# Patient Record
(demographics unavailable — no encounter records)

---

## 2024-12-07 NOTE — PLAN
[FreeTextEntry1] : 1.  Health maintenance Testicular Self-Exams recommended  Annual skin cancer screening is recommended Safe Sex Counseling given Exercise and Nutrition counseling given     2.  Prediabetes Exercise and nutrition counseling given, will check A1c, continue with metformin and Ozempic  3.  Obesity Patient is almost out of the obesity range, BMI is around 30 improving, psychosocial support provided, diet and exercise counseling given  4.  Preexposure prophylaxis use for HIV As patient has not been sexually active we will skip the 3 site GC chlamydia swabs, HIV and syphilis screening, safe sex counseling

## 2024-12-07 NOTE — HISTORY OF PRESENT ILLNESS
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

## 2024-12-07 NOTE — HISTORY OF PRESENT ILLNESS
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

## 2024-12-07 NOTE — PHYSICAL EXAM
[No Acute Distress] : no acute distress [Well Nourished] : well nourished [Well Developed] : well developed [Well-Appearing] : well-appearing [Normal Sclera/Conjunctiva] : normal sclera/conjunctiva [PERRL] : pupils equal round and reactive to light [EOMI] : extraocular movements intact [Normal Outer Ear/Nose] : the outer ears and nose were normal in appearance [Normal Oropharynx] : the oropharynx was normal [No JVD] : no jugular venous distention [No Lymphadenopathy] : no lymphadenopathy [Supple] : supple [Thyroid Normal, No Nodules] : the thyroid was normal and there were no nodules present [No Respiratory Distress] : no respiratory distress  [No Accessory Muscle Use] : no accessory muscle use [Clear to Auscultation] : lungs were clear to auscultation bilaterally [Normal Rate] : normal rate  [Regular Rhythm] : with a regular rhythm [Normal S1, S2] : normal S1 and S2 [No Murmur] : no murmur heard [No Carotid Bruits] : no carotid bruits [No Abdominal Bruit] : a ~M bruit was not heard ~T in the abdomen [No Varicosities] : no varicosities [Pedal Pulses Present] : the pedal pulses are present [No Edema] : there was no peripheral edema [No Palpable Aorta] : no palpable aorta [No Extremity Clubbing/Cyanosis] : no extremity clubbing/cyanosis [Soft] : abdomen soft [Non Tender] : non-tender [Non-distended] : non-distended [No Masses] : no abdominal mass palpated [No HSM] : no HSM [Normal Bowel Sounds] : normal bowel sounds [Normal Posterior Cervical Nodes] : no posterior cervical lymphadenopathy [Normal Anterior Cervical Nodes] : no anterior cervical lymphadenopathy [No CVA Tenderness] : no CVA  tenderness [No Spinal Tenderness] : no spinal tenderness [No Joint Swelling] : no joint swelling [Grossly Normal Strength/Tone] : grossly normal strength/tone [No Rash] : no rash [Coordination Grossly Intact] : coordination grossly intact [No Focal Deficits] : no focal deficits [Normal Gait] : normal gait [Deep Tendon Reflexes (DTR)] : deep tendon reflexes were 2+ and symmetric [Normal Affect] : the affect was normal [Normal Insight/Judgement] : insight and judgment were intact [de-identified] : Obese

## 2024-12-07 NOTE — PHYSICAL EXAM
[No Acute Distress] : no acute distress [Well Nourished] : well nourished [Well Developed] : well developed [Well-Appearing] : well-appearing [Normal Sclera/Conjunctiva] : normal sclera/conjunctiva [PERRL] : pupils equal round and reactive to light [EOMI] : extraocular movements intact [Normal Outer Ear/Nose] : the outer ears and nose were normal in appearance [Normal Oropharynx] : the oropharynx was normal [No JVD] : no jugular venous distention [No Lymphadenopathy] : no lymphadenopathy [Supple] : supple [Thyroid Normal, No Nodules] : the thyroid was normal and there were no nodules present [No Respiratory Distress] : no respiratory distress  [No Accessory Muscle Use] : no accessory muscle use [Clear to Auscultation] : lungs were clear to auscultation bilaterally [Normal Rate] : normal rate  [Regular Rhythm] : with a regular rhythm [Normal S1, S2] : normal S1 and S2 [No Murmur] : no murmur heard [No Carotid Bruits] : no carotid bruits [No Abdominal Bruit] : a ~M bruit was not heard ~T in the abdomen [No Varicosities] : no varicosities [Pedal Pulses Present] : the pedal pulses are present [No Edema] : there was no peripheral edema [No Palpable Aorta] : no palpable aorta [No Extremity Clubbing/Cyanosis] : no extremity clubbing/cyanosis [Soft] : abdomen soft [Non Tender] : non-tender [Non-distended] : non-distended [No Masses] : no abdominal mass palpated [No HSM] : no HSM [Normal Bowel Sounds] : normal bowel sounds [Normal Posterior Cervical Nodes] : no posterior cervical lymphadenopathy [Normal Anterior Cervical Nodes] : no anterior cervical lymphadenopathy [No CVA Tenderness] : no CVA  tenderness [No Spinal Tenderness] : no spinal tenderness [No Joint Swelling] : no joint swelling [Grossly Normal Strength/Tone] : grossly normal strength/tone [No Rash] : no rash [Coordination Grossly Intact] : coordination grossly intact [No Focal Deficits] : no focal deficits [Normal Gait] : normal gait [Deep Tendon Reflexes (DTR)] : deep tendon reflexes were 2+ and symmetric [Normal Affect] : the affect was normal [Normal Insight/Judgement] : insight and judgment were intact [de-identified] : Obese

## 2024-12-20 NOTE — ASSESSMENT
[FreeTextEntry1] : 43 year old male, works for [employer], almost  at his goal,  needs to learn to maintain, has now been on Ozempic  and metformin and should continue   1. Journal all foods eaten every day, and especially for two days prior to seeing me again 2. increase the veggies, magaly with lunch, cut back on the number of eggs per week,fill with the veggies 3. Non starchy vegetables with lunch and dinner: as much as you want 4. Limit  eating out, remember " special treat foods"  5 Exercise: the goal is for 150 min of cardiovascular exercise and 60 minutes of weight training per week 6. add weights to his regimen  7. want to get on avg 7 hours of sleep 8 Finish your last eating at best by 7 pm and at worst by 8 pm and you need 2 hours from your finished meal prior to going to bed 9. No to  limited  alcohol  10. Lots of plant based and whole grain foods: beans, brown rice, grains and starchy vegetables 11. Follow up in about 6 weeks.   90 min

## 2024-12-20 NOTE — HISTORY OF PRESENT ILLNESS
[Improved Health] : Improved health [Improved Looks/Aesthetics] : Improved looks/aesthetics [Young Adult] : yound adult [Cut/Track Calories] : cut/track calories [Exercise: ____] : exercise: [unfilled] [Prescription Medications: _____] : prescription medications: [unfilled] [Time management] : time management [Cravings] : cravings [Depression/anxiety] : depression/anxiety [I usually sleep 6-8 hours] : I usually sleep 6-8 hours [I snore] : I snore [Lunch] : lunch [Dinner] : dinner [Late night] : late night [1-2] : Meat, fish, poultry, eggs, cheese: 1-2 [Less than 1] : Sweets: less than 1 [2] : Fast food - meals per week: 2 [6] : How many cups of water do you regularly drink per day: 6 [Self] : self [Eat Past Satisfaction] : eat past satisfaction [Emotional Eating] : emotional eating [Boredom Eating] : boredom eating [Finish Your Plate Mentality] : finish your plate mentality [2+ miles] : Walking distance capability: 2+ miles [Gym classes] : gym classes [Cardio machines: treadmill/spinning/elliptical] : cardio machines: treadmill/spinning/elliptical [4] : 4 [30] : 30 [0] : 0 [None] : none [HTN] : HTN [LISETH] : LISETH [Metformin] : metformin  [Other: ___] : [unfilled] [FreeTextEntry2] : 175 [FreeTextEntry3] : 230 [] : No [FreeTextEntry1] : 43 year old male interested in further wt loss, started on ozempic for the past 2 years, down for 264 to 203 and interested in further   Breakfast:  none   Lunch  veggie omelet, 3 eggs spinach , onions  or cereal: fiber with almond unsweetened water  snack  none   dinner 7-8 meals from " [employer]"  shrimp with quinoa and veggies and water  water  snack  chips, apple    ETOH none, poss on the weekends   sleep 6-7 hours exer as above

## 2024-12-20 NOTE — CONSULT LETTER
[Dear  ___] : Dear  [unfilled], [Consult Letter:] : I had the pleasure of evaluating your patient, [unfilled]. [Please see my note below.] : Please see my note below. [Consult Closing:] : Thank you very much for allowing me to participate in the care of this patient.  If you have any questions, please do not hesitate to contact me. [Sincerely,] : Sincerely, [FreeTextEntry3] : Praveena Velasquez MD

## 2025-02-20 NOTE — HISTORY OF PRESENT ILLNESS
[de-identified] : 43 year year old male patient came in for regular follow up on his pre exposure prophylaxis use for HIV prevention and follow-up on chronic conditions of prediabetes and obesity.   MSM, patient reports he is 100% compliant with PREP.  Has not been sexually active in the last quarter however would like to continue taking PrEP admits to insertive and receptive anal intercourse.   Patient with obesity and prediabetes, on medication therapy with metformin and GLP-1 medication Ozempic, also establish care with weight management doctor, reports doing well, has significant weight loss and would like to continue the medications, in the last quarter had slight weight gain

## 2025-02-20 NOTE — PLAN
[FreeTextEntry1] : Safe sex counseling given, STI screening today, phlebotomy done in the office, continue with PrEP   Weight management counseling provided

## 2025-02-20 NOTE — HISTORY OF PRESENT ILLNESS
[de-identified] : 43 year year old male patient came in for regular follow up on his pre exposure prophylaxis use for HIV prevention and follow-up on chronic conditions of prediabetes and obesity.   MSM, patient reports he is 100% compliant with PREP.  Has not been sexually active in the last quarter however would like to continue taking PrEP admits to insertive and receptive anal intercourse.   Patient with obesity and prediabetes, on medication therapy with metformin and GLP-1 medication Ozempic, also establish care with weight management doctor, reports doing well, has significant weight loss and would like to continue the medications, in the last quarter had slight weight gain

## 2025-02-20 NOTE — HISTORY OF PRESENT ILLNESS
[de-identified] : 43 year year old male patient came in for regular follow up on his pre exposure prophylaxis use for HIV prevention and follow-up on chronic conditions of prediabetes and obesity.   MSM, patient reports he is 100% compliant with PREP.  Has not been sexually active in the last quarter however would like to continue taking PrEP admits to insertive and receptive anal intercourse.   Patient with obesity and prediabetes, on medication therapy with metformin and GLP-1 medication Ozempic, also establish care with weight management doctor, reports doing well, has significant weight loss and would like to continue the medications, in the last quarter had slight weight gain

## 2025-05-15 NOTE — PLAN
[FreeTextEntry1] : 1, encounter for preexposure prophylaxis for HIV Safe sex counseling given, STI screening today, phlebotomy done in the office, continue with PrEP   2.  Obesity We had a long counseling session about patient's treatment options, patient insurance previously denied Wegovy therefore is unlikely we can increase the dosage of semaglutide dosage, patient had questions in regards to Zepbound medication as an alternative, I explained to patient that we can do the switch however insurance coverage for the medication cannot be guaranteed.  Alternatively explained to mother med management modalities, increased c cardiovascular exercise, calorie reduction.  Psychosocial support provided, patient's questions were answered

## 2025-05-15 NOTE — HISTORY OF PRESENT ILLNESS
[de-identified] : 43 year year old male patient came in for regular follow up on his pre exposure prophylaxis use for HIV prevention. Patient reports he is 1 utilizing PrEP on demand basis, he has not had any sexual partners in the last quarter, would like to still continue and is he is anticipating resumption of sexual activity  Patient with obesity and prediabetes, currently on medication therapy with Ozempic 2 mg once weekly and metformin 500 mg twice daily, overall reports compliance, his weight has plateaued in the last quarter, did not gain weight, has been dealing with some stressors believe he has been stressed eating therefore was not able to lose weight.  He has questions in regards to alternative PHM such as pounds.  He had some additional questions that he brought in as a list